# Patient Record
Sex: FEMALE | Race: OTHER | URBAN - METROPOLITAN AREA
[De-identification: names, ages, dates, MRNs, and addresses within clinical notes are randomized per-mention and may not be internally consistent; named-entity substitution may affect disease eponyms.]

---

## 2020-11-25 ENCOUNTER — TELEPHONE (OUTPATIENT)
Dept: INTERNAL MEDICINE CLINIC | Facility: HOSPITAL | Age: 40
End: 2020-11-25

## 2020-11-25 NOTE — TELEPHONE ENCOUNTER
Department: NICU                                 [x] [facility]  []CHANTEL   [] [facility]    Dept Manager/Supervisor/team or clinical lead: Jacinta Guaman    Position:  [] MD     [x] RN     [] Respiratory Therapist     [] PCT     [] Other       SYMPTOMS:      • Fever whom?   Do you have any family members sick at home? [] Yes    [x] No   If yes, explain:       NOTES: (narrative documentation)    Had testing through MD office, states not rapid.  States she will follow up with us if she has a positive test for return

## 2020-11-27 ENCOUNTER — DOCUMENTATION ONLY (OUTPATIENT)
Dept: INTERNAL MEDICINE CLINIC | Facility: HOSPITAL | Age: 40
End: 2020-11-27

## 2020-11-27 NOTE — TELEPHONE ENCOUNTER
Results and RTW guidelines:    COVID RESULT GIVEN:      [x] NEGATIVE     Ordered  retest?  []Yes   [x] No   [x] Originally Symptomatic or Now Symptomatic: Home until further direction from Hotline       Notes: Went to PCP for PCR on 11/24/2020 at

## 2021-04-20 ENCOUNTER — IMMUNIZATION (OUTPATIENT)
Dept: LAB | Age: 41
End: 2021-04-20
Attending: HOSPITALIST
Payer: COMMERCIAL

## 2021-04-20 DIAGNOSIS — Z23 NEED FOR VACCINATION: Primary | ICD-10-CM

## 2021-04-20 PROCEDURE — 0001A SARSCOV2 VAC 30MCG/0.3ML IM: CPT

## 2021-05-11 ENCOUNTER — IMMUNIZATION (OUTPATIENT)
Dept: LAB | Age: 41
End: 2021-05-11
Attending: HOSPITALIST
Payer: COMMERCIAL

## 2021-05-11 DIAGNOSIS — Z23 NEED FOR VACCINATION: Primary | ICD-10-CM

## 2021-05-11 PROCEDURE — 0002A SARSCOV2 VAC 30MCG/0.3ML IM: CPT

## 2022-10-31 ENCOUNTER — IMMUNIZATION (OUTPATIENT)
Dept: LAB | Facility: HOSPITAL | Age: 42
End: 2022-10-31
Attending: PREVENTIVE MEDICINE
Payer: COMMERCIAL

## 2022-10-31 DIAGNOSIS — Z23 NEED FOR VACCINATION: Primary | ICD-10-CM

## 2022-10-31 PROCEDURE — 90471 IMMUNIZATION ADMIN: CPT

## 2023-09-19 ENCOUNTER — APPOINTMENT (OUTPATIENT)
Dept: OTHER | Facility: HOSPITAL | Age: 43
End: 2023-09-19
Attending: PREVENTIVE MEDICINE

## 2023-09-20 ENCOUNTER — OFFICE VISIT (OUTPATIENT)
Dept: OTHER | Facility: HOSPITAL | Age: 43
End: 2023-09-20
Attending: PREVENTIVE MEDICINE

## 2023-09-20 ENCOUNTER — HOSPITAL ENCOUNTER (OUTPATIENT)
Dept: GENERAL RADIOLOGY | Facility: HOSPITAL | Age: 43
Discharge: HOME | End: 2023-09-20
Attending: PREVENTIVE MEDICINE
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
## 2023-09-20 DIAGNOSIS — M25.572 ACUTE LEFT ANKLE PAIN: ICD-10-CM

## 2023-09-20 DIAGNOSIS — M25.572 ACUTE LEFT ANKLE PAIN: Primary | ICD-10-CM

## 2023-09-20 PROCEDURE — 73610 X-RAY EXAM OF ANKLE: CPT | Performed by: PREVENTIVE MEDICINE

## 2023-09-26 ENCOUNTER — APPOINTMENT (OUTPATIENT)
Dept: OTHER | Facility: HOSPITAL | Age: 43
End: 2023-09-26
Attending: PREVENTIVE MEDICINE

## 2024-08-09 ENCOUNTER — TELEPHONE (OUTPATIENT)
Dept: INTERNAL MEDICINE CLINIC | Facility: HOSPITAL | Age: 44
End: 2024-08-09

## 2024-08-09 NOTE — TELEPHONE ENCOUNTER
Outside Covid Testing done    Results and RTW guidelines:    COVID RESULT reported:      Test type:    [] Rapid outside         [] PCR outside       [x] Home Test    Date of test: 08/08/2024     Test location: Home         [] Result viewed in Epic with verbal consent received from employee     [x] Results per Employee Covid Dashboard    [] Employee instructed to email copy of result to [email or web address]       [x] Discussed with employee     [] Unable to reach by phone.  Sent via [employer] message      [x] Positive    - Employee should quarantine at home for at least 5 days (day 1 is day after sx onset) , follow the CDC guidelines for cleaning and                              quarantining; see CDC.gov   -This employee may RTW on day 6 if asymptomatic or mildly symptomatic (with improving symptoms).  Call Employee Health on day 5 if unable to return on                      day 6 after symptom onset.    -This employee needs to call Employee Health on day 5 after symptom onset.  The employee needs to be cleared by Employee Health.  - If Employee is still experiencing severe symptoms must make a RTW appt with Employee Health, Employee will not be cleared if:    1. Has consistent cough, shortness of breath or fatigue that restricts your physical activities    2. Is still feeling \"unwell\"    3. Within 15 days of hospitalization for COVID    4. Within 20 days of intubation for COVID    5. Still has a fever, vomiting or diarrhea   - Keep communication open with management about RTW and if symptoms worsen    - Monitor sx and temperature    - Employee should quarantine at home for at least 5 days from sx onset, follow the CDC guidelines for cleaning and quarantining; see CDC.gov                  - Notify PCP of result                 - Seek emergent care with worsening symptoms        Notes:     RTW PLAN:    [x]  If COVID positive results, off work minimum of 5 days from positive test or onset of symptoms (day 0)         On day 5, if asymptomatic or mildly symptomatic (with improving symptoms) may return to work day 6          On day 5, if symptomatic, call Employee Health for RTW screening        []  COVID positive result - call Employee Health on day 5 after symptom onset.  The employee needs to be cleared by Employee Health to RTW.  [] RTW immediately, continue to monitor for sx  [] RTW when sx improve; must be fever free for 24 hours w/o medications, Diarrhea/Vomiting free for 24 hours w/o medications  [] Alinity ordered; continue to monitor sx and call for new/worsening sx.  Discuss RTW guidelines with manager             [] Rapid ordered to confirm home negative.   [] May continue to work  [x] Follow up with PCP  [] Home until further instruction from hotline with Alinity results  INSTRUCTIONS PROVIDED:  [x]  Plan as noted above  []  Length of time to obtain results  []  May return to work if views negative in My Chart and  remains fever, vomiting, and diarrhea free  []  May continue to work if remains asymptomatic   [x]  Quarantine instructions  [x]  Masking protocol  []  S/S of worsening infection/condition and importance of prompt medical re-evaluation including when to seek emergency care  [] If symptoms develop, stay home and call hotline for rapid test order    Estimated RTW date:  08/14/2024  [x] Manager notified        [x] [facility]  []CHANTEL   [] [facility]  Manager : Jamila Lopez     [x] Direct Patient Care  []Indirect Patient Contact   [] Non-Clinical/No Patient Contact    High Risk Area:    [x] Yes   [] No    For Direct Patient Care ONLY: Have you been fitted with an N95 mask? [x] Yes  []No      HAVE YOU RECEIVED THE COVID-19 Vaccine? Yes [x]    No []          If yes, date(s) received:   04/20/2021 05/11/2021          Which vaccine:  Pfizer []     Moderna []    J&J []      SYMPTOMS (reported via dashboard):  [] asymptomatic  [x] symptomatic  [] GI symptoms only    Symptom onset date: 08/06/2024    Fever   > 100F             Yes []       Cough                          Yes [x]      Shortness of breath  Yes []      Congestion                 Yes [x]      Runny nose                Yes [x]        Loss of Smell              Yes []        Loss of Taste             Yes []       Sore throat                 Yes []       Fatigue                        Yes [x]       Body Aches                Yes []        Chills                           Yes []        Headache                   Yes [x]             GI symptoms             Yes []     No [x]                     Nausea   []          Vomiting            []                                    Diarrhea  []          Upset stomach []      Employee reported COVID Exposure?  Yes []     No [x]    Date of exposure:   []  Coworker                       [] patient                        [] Family/friend    PPE:   [] N95 Mask/PAPR  [] Standard Mask  [] Eyewear  [] None    Within 6 feet for >15 minutes? [] Yes []  No    Is this a true exposure? []  Yes []  No    When was the last shift you worked?: 08/07/2024    Employee has a history of Covid?  Yes []     No [x]   If Yes, when:    Notes: